# Patient Record
Sex: MALE | Race: OTHER
[De-identification: names, ages, dates, MRNs, and addresses within clinical notes are randomized per-mention and may not be internally consistent; named-entity substitution may affect disease eponyms.]

---

## 2022-02-17 ENCOUNTER — TRANSCRIPTION ENCOUNTER (OUTPATIENT)
Age: 62
End: 2022-02-17

## 2024-01-05 ENCOUNTER — NON-APPOINTMENT (OUTPATIENT)
Age: 64
End: 2024-01-05

## 2024-02-08 PROBLEM — Z00.00 ENCOUNTER FOR PREVENTIVE HEALTH EXAMINATION: Status: ACTIVE | Noted: 2024-02-08

## 2024-02-09 ENCOUNTER — NON-APPOINTMENT (OUTPATIENT)
Age: 64
End: 2024-02-09

## 2024-02-09 ENCOUNTER — APPOINTMENT (OUTPATIENT)
Dept: ORTHOPEDIC SURGERY | Facility: CLINIC | Age: 64
End: 2024-02-09
Payer: COMMERCIAL

## 2024-02-09 VITALS
SYSTOLIC BLOOD PRESSURE: 153 MMHG | HEART RATE: 76 BPM | BODY MASS INDEX: 33.34 KG/M2 | WEIGHT: 220 LBS | HEIGHT: 68 IN | DIASTOLIC BLOOD PRESSURE: 89 MMHG

## 2024-02-09 DIAGNOSIS — M70.42 PREPATELLAR BURSITIS, RIGHT KNEE: ICD-10-CM

## 2024-02-09 DIAGNOSIS — M70.41 PREPATELLAR BURSITIS, RIGHT KNEE: ICD-10-CM

## 2024-02-09 DIAGNOSIS — Z86.39 PERSONAL HISTORY OF OTHER ENDOCRINE, NUTRITIONAL AND METABOLIC DISEASE: ICD-10-CM

## 2024-02-09 DIAGNOSIS — M25.561 PAIN IN RIGHT KNEE: ICD-10-CM

## 2024-02-09 DIAGNOSIS — Z86.79 PERSONAL HISTORY OF OTHER DISEASES OF THE CIRCULATORY SYSTEM: ICD-10-CM

## 2024-02-09 PROCEDURE — 73564 X-RAY EXAM KNEE 4 OR MORE: CPT | Mod: 50

## 2024-02-09 PROCEDURE — 99204 OFFICE O/P NEW MOD 45 MIN: CPT

## 2024-02-09 NOTE — HISTORY OF PRESENT ILLNESS
[de-identified] : 2/9/24: Patient presents with his right greater than left knee pain.  States been going on for a few weeks.  Has had it previously but it comes and goes.  Did a 5K light jog last Sunday and was totally fine.  His pain is mostly on the anterior aspect of his knees around his patella tendon.  Does have pain with kneeling which seems to make it worse.  He is active and otherwise does not have issues.  Denies buckling, radiating pain numbness or tingling.  Occasionally takes ibuprofen for the pain which does help.  Allergies-Levaquin-Achilles tendon rupture, denies anticoagulation, PMH-diabetes, B-cell lymphoma now okay, hypertension

## 2024-02-09 NOTE — PHYSICAL EXAM
[de-identified] : Bilateral lower extremity Hip: Normal ROM without pain on IR/ER Knee Inspection: no effusion Wounds: none Alignment: Neutral Palpation: Mildly tender palpation over patella tendon insertion. ROM active (in degrees): 0-140 without crepitus or pain through the arc of motion Ligamentous laxity: stable to varus stress test, stable to valgus stress test Meniscal Test: negative McMurrays, negative Brown Muscle Test: good quad strength, no pain with resisted extension [de-identified] : 2/9/24: Bilateral knee xrays, standing AP/Lateral and Merchant films, and 45 degree PA standing view taken today in the office are reviewed and demonstrate preserved joint space, minute osteophyte formation

## 2024-02-09 NOTE — DISCUSSION/SUMMARY
[de-identified] : Right more than left prepatellar bursitis/mild patella tendinitis  Extensive discussion of the natural history of this issue was had with the patient.  We discussed the treatment options focusing on conservative therapy which includes anti-inflammatories, physical therapy/home exercise, & activity modification.   Recommend avoid kneeling.  Use kneepads if he must kneel.  Recommend topical Voltaren gel. Patient will return if the pain returns or does not resolve.  The patient's current medication management of their orthopedic diagnosis was reviewed today: (1) We discussed a comprehensive treatment plan that included possible pharmaceutical management involving the use of prescription strength medications including but not limited to options such as oral Ibuprofen 400mg QID, once daily Meloxicam 15 mg, or 500-650 mg Tylenol versus over the counter oral medications and topical prescription NSAID Pennsaid vs over the counter Voltaren gel. (2) There is a moderate risk of morbidity with further treatment, especially from use of prescription strength medications and possible side effects of these medications which include upset stomach with oral medications, skin reactions to topical medications and cardiac/renal issues with long term use. (3) I recommended that the patient follow-up with their medical physician to discuss any significant specific potential issues with long term medication use such as interactions with current medications or with exacerbation of underlying medical comorbidities. (4) The benefits and risks associated with use of injectable, oral or topical, prescription and over the counter anti-inflammatory medications were discussed with the patient. The patient voiced understanding of the risks including but not limited to bleeding, stroke, kidney dysfunction, heart disease, and were referred to the black box warning label for further information.